# Patient Record
Sex: MALE | Employment: FULL TIME | URBAN - METROPOLITAN AREA
[De-identification: names, ages, dates, MRNs, and addresses within clinical notes are randomized per-mention and may not be internally consistent; named-entity substitution may affect disease eponyms.]

---

## 2020-12-23 ENCOUNTER — APPOINTMENT (OUTPATIENT)
Dept: URGENT CARE | Facility: CLINIC | Age: 35
End: 2020-12-23
Payer: COMMERCIAL

## 2020-12-23 DIAGNOSIS — Z11.59 SCREENING FOR VIRAL DISEASE: Primary | ICD-10-CM

## 2020-12-23 PROCEDURE — U0003 INFECTIOUS AGENT DETECTION BY NUCLEIC ACID (DNA OR RNA); SEVERE ACUTE RESPIRATORY SYNDROME CORONAVIRUS 2 (SARS-COV-2) (CORONAVIRUS DISEASE [COVID-19]), AMPLIFIED PROBE TECHNIQUE, MAKING USE OF HIGH THROUGHPUT TECHNOLOGIES AS DESCRIBED BY CMS-2020-01-R: HCPCS

## 2020-12-24 LAB — SARS-COV-2 RNA SPEC QL NAA+PROBE: NOT DETECTED

## 2020-12-28 ENCOUNTER — TELEPHONE (OUTPATIENT)
Dept: URGENT CARE | Facility: CLINIC | Age: 35
End: 2020-12-28

## 2020-12-29 ENCOUNTER — APPOINTMENT (OUTPATIENT)
Dept: URGENT CARE | Facility: CLINIC | Age: 35
End: 2020-12-29

## 2020-12-29 DIAGNOSIS — Z11.59 SCREENING FOR VIRAL DISEASE: Primary | ICD-10-CM

## 2020-12-29 PROCEDURE — U0003 INFECTIOUS AGENT DETECTION BY NUCLEIC ACID (DNA OR RNA); SEVERE ACUTE RESPIRATORY SYNDROME CORONAVIRUS 2 (SARS-COV-2) (CORONAVIRUS DISEASE [COVID-19]), AMPLIFIED PROBE TECHNIQUE, MAKING USE OF HIGH THROUGHPUT TECHNOLOGIES AS DESCRIBED BY CMS-2020-01-R: HCPCS

## 2020-12-31 LAB — SARS-COV-2 RNA SPEC QL NAA+PROBE: DETECTED

## 2021-01-02 ENCOUNTER — TELEPHONE (OUTPATIENT)
Dept: DERMATOLOGY | Facility: CLINIC | Age: 36
End: 2021-01-02

## 2021-01-02 NOTE — TELEPHONE ENCOUNTER
1st Attempt - The patient was called for notification of a test result for COVID-19  The patient did not answer the phone and a voicemail was left requesting a call back to [phone], Option 7

## 2021-01-06 ENCOUNTER — TELEPHONE (OUTPATIENT)
Dept: OTHER | Facility: OTHER | Age: 36
End: 2021-01-06

## 2021-01-07 NOTE — TELEPHONE ENCOUNTER
The patient was called for notification of a test result for COVID-19  The patient did not answer the phone and a voicemail was left requesting a call back to [phone], Option 7

## 2021-01-07 NOTE — TELEPHONE ENCOUNTER
2nd attempt   The patient was called for notification of a test result for COVID-19  The patient did not answer the phone and a voicemail was left requesting a call back to [phone], Option 7

## 2023-05-26 ENCOUNTER — APPOINTMENT (OUTPATIENT)
Dept: LAB | Facility: HOSPITAL | Age: 38
End: 2023-05-26

## 2023-05-26 ENCOUNTER — OCCMED (OUTPATIENT)
Dept: URGENT CARE | Facility: CLINIC | Age: 38
End: 2023-05-26
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
## 2023-05-26 LAB
ALT SERPL W P-5'-P-CCNC: 44 U/L (ref 7–52)
HIV 1+2 AB+HIV1 P24 AG SERPL QL IA: NORMAL
HIV 2 AB SERPL QL IA: NORMAL
HIV1 AB SERPL QL IA: NORMAL
HIV1 P24 AG SERPL QL IA: NORMAL

## 2023-05-28 LAB
HBV SURFACE AB SER-ACNC: 224 MIU/ML
HBV SURFACE AG SER QL: NORMAL
HCV AB SER QL: NORMAL

## 2024-11-07 ENCOUNTER — OFFICE VISIT (OUTPATIENT)
Dept: FAMILY MEDICINE CLINIC | Facility: CLINIC | Age: 39
End: 2024-11-07
Payer: COMMERCIAL

## 2024-11-07 VITALS
BODY MASS INDEX: 27.47 KG/M2 | OXYGEN SATURATION: 98 % | DIASTOLIC BLOOD PRESSURE: 67 MMHG | WEIGHT: 181.22 LBS | HEART RATE: 101 BPM | SYSTOLIC BLOOD PRESSURE: 117 MMHG | HEIGHT: 68 IN

## 2024-11-07 DIAGNOSIS — E11.69 ERECTILE DYSFUNCTION DUE TO DIABETES MELLITUS  (HCC): ICD-10-CM

## 2024-11-07 DIAGNOSIS — E11.9 DIABETES MELLITUS WITH HEMOGLOBIN A1C GOAL OF 7.0%-8.0% (HCC): Primary | ICD-10-CM

## 2024-11-07 DIAGNOSIS — Z83.3 FAMILY HISTORY OF DIABETES MELLITUS (DM): ICD-10-CM

## 2024-11-07 DIAGNOSIS — Z82.49 FAMILY HISTORY OF EARLY CAD: ICD-10-CM

## 2024-11-07 DIAGNOSIS — G47.33 OSA ON CPAP: ICD-10-CM

## 2024-11-07 DIAGNOSIS — G43.009 MIGRAINE WITHOUT AURA AND WITHOUT STATUS MIGRAINOSUS, NOT INTRACTABLE: ICD-10-CM

## 2024-11-07 DIAGNOSIS — Z56.6 STRESS AT WORK: ICD-10-CM

## 2024-11-07 DIAGNOSIS — N52.1 ERECTILE DYSFUNCTION DUE TO DIABETES MELLITUS  (HCC): ICD-10-CM

## 2024-11-07 DIAGNOSIS — R35.0 URINARY FREQUENCY: ICD-10-CM

## 2024-11-07 LAB
SL AMB  POCT GLUCOSE, UA: 500
SL AMB LEUKOCYTE ESTERASE,UA: ABNORMAL
SL AMB POCT BILIRUBIN,UA: NEGATIVE
SL AMB POCT BLOOD,UA: NEGATIVE
SL AMB POCT CLARITY,UA: CLEAR
SL AMB POCT COLOR,UA: YELLOW
SL AMB POCT HEMOGLOBIN AIC: 11.9 (ref ?–6.5)
SL AMB POCT KETONES,UA: 5
SL AMB POCT NITRITE,UA: NEGATIVE
SL AMB POCT PH,UA: 5
SL AMB POCT SPECIFIC GRAVITY,UA: 1.02
SL AMB POCT URINE PROTEIN: NEGATIVE
SL AMB POCT UROBILINOGEN: NEGATIVE

## 2024-11-07 PROCEDURE — 99204 OFFICE O/P NEW MOD 45 MIN: CPT | Performed by: PHYSICIAN ASSISTANT

## 2024-11-07 PROCEDURE — 82043 UR ALBUMIN QUANTITATIVE: CPT | Performed by: PHYSICIAN ASSISTANT

## 2024-11-07 PROCEDURE — 87086 URINE CULTURE/COLONY COUNT: CPT | Performed by: PHYSICIAN ASSISTANT

## 2024-11-07 PROCEDURE — 83036 HEMOGLOBIN GLYCOSYLATED A1C: CPT | Performed by: PHYSICIAN ASSISTANT

## 2024-11-07 PROCEDURE — 82570 ASSAY OF URINE CREATININE: CPT | Performed by: PHYSICIAN ASSISTANT

## 2024-11-07 PROCEDURE — 81002 URINALYSIS NONAUTO W/O SCOPE: CPT | Performed by: PHYSICIAN ASSISTANT

## 2024-11-07 RX ORDER — INSULIN DEGLUDEC 100 U/ML
10 INJECTION, SOLUTION SUBCUTANEOUS
Qty: 15 ML | Refills: 3 | Status: SHIPPED | OUTPATIENT
Start: 2024-11-07

## 2024-11-07 RX ORDER — ATORVASTATIN CALCIUM 10 MG/1
10 TABLET, FILM COATED ORAL DAILY
Qty: 30 TABLET | Refills: 5 | Status: SHIPPED | OUTPATIENT
Start: 2024-11-07

## 2024-11-07 RX ORDER — SILDENAFIL 50 MG/1
50 TABLET, FILM COATED ORAL DAILY PRN
Qty: 10 TABLET | Refills: 0 | Status: SHIPPED | OUTPATIENT
Start: 2024-11-07

## 2024-11-07 SDOH — HEALTH STABILITY - MENTAL HEALTH: OTHER PHYSICAL AND MENTAL STRAIN RELATED TO WORK: Z56.6

## 2024-11-07 NOTE — PROGRESS NOTES
Ambulatory Visit  Name: Dipak Temple      : 1985      MRN: [id number]  Encounter Provider: Meg Russell PA-C  Encounter Date: 2024   Encounter department: [facility]    Assessment & Plan  Urinary frequency  Patient's primary complaint today was having urinary frequency of very small amounts of urine.  He was not having hesitancy nor was he having a decrease in the force of his stream.  He states that he would void very frequently over the past 2-1/2 weeks but he was only producing a tiny amount of urine.  The symptoms began abruptly.  He had nocturia x 4-5 times.  He was not having any dysuria and he did not notice any hematuria or polyuria.  He had never had the symptoms in the past and the abrupt onset of his symptoms was the reason that he made the appointment.    Urine dip was done in the office with the results revealing 500 mg/Meghan glucose.  Without other explanation, this is consistent with new onset diabetes.  Orders:    POCT urine dip    HUNTER on CPAP  Patient states that he was diagnosed with obstructive sleep apnea and was maintained on a CPAP machine.  He was running the machine and it became too expensive for him and he therefore turned the machine back in order to save money.  He would like to regain his machine in the future if his finances improve.       Family history of diabetes mellitus (DM)  Patient's sister is having problems with retinopathy due to her diabetes.  His dad and his brother were also diagnosed with diabetes.  He is not know the details of whether they were type I or type II diabetic.       Family history of early CAD  Multiple family members have history of coronary artery disease especially given early age.  Patient is unsure if whether these members also have diabetes.       Migraine without aura and without status migrainosus, not intractable  Patient is getting 3-4 migraines per day.  As soon as the onset of migraine  occurs, the patient will take Excedrin over-the-counter which seems to help.  He is not formally been investigated for migraine but his symptoms are consistent with migraine without aura.  He is light sensitive and sound sensitive and nauseated.  He is unable to continue activities.  He also describes the pain as a pounding sensation.  Headaches are typically unilateral but severe migraines that do not respond to initial treatment with Excedrin can cause pain behind both eyes.       Stress at work  Patient is working up to 20 hours/day 7 days/week.  There are days that he works 3 consecutive 24-hour shifts without a break.  There are sleeping quarters that he can use and depending on how busy his job is, he may or may not be able to get rest.  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  He likes his job but he states he does not get to see his wife often as she works a corollary job and also spends up to 150 hours/week at the worksite.  The couple previously lived in [facility] and has recently relocated to the [facility].       Diabetes mellitus with hemoglobin A1c goal of 7.0%-8.0% ([facility])  Patient diagnosed with diabetes in the office today.  He was symptomatic with polydipsia drinking 1 gallon of water per day.  This was an abrupt change for him.  Water is his preferred beverage.  He cannot understand how he can drink a gallon of water and not produce much urine.  He was not having polyphasia.  He was not having any weight change.  His BMI is normal at 27.    Due to his age of 39, it is uncertain whether this is type I or type 2 diabetes.  His abrupt onset would be consistent with type I along with his normal body weight.  Insulin antibodies are being ordered to help to make this determination.    Patient is being sent to Dr. Brown for endocrinology evaluation and to [facility] for clinical pharmacy.  The patient may need a  "continuous glucose monitor along with supplies for checking his blood sugars.  I will allow her to make recommendations for the type of glucometer versus continuous glucose monitor and sensors.  No results found for: \"HGBA1C\"  Hemoglobin A1c was 11.9% in the office today.  Glucosuria was present to the tune of 500 mg/Meghan.    Patient was started on a statin low-dose in the form of atorvastatin.  Lipid profile is being done and we can dose adjust accordingly.    Additional orders were written as below for further evaluation.  Orders:    HEMOGLOBIN A1C W/ EAG ESTIMATION; Future    Comprehensive metabolic panel; Future    Lipid Panel with Direct LDL reflex; Future    TSH, 3rd generation with Free T4 reflex; Future    Ambulatory referral to clinical pharmacy; Future    sildenafil (VIAGRA) 50 MG tablet; Take 1 tablet (50 mg total) by mouth daily as needed for erectile dysfunction    Ambulatory Referral to Endocrinology; Future    Ambulatory Referral to Ophthalmology; Future    Lipid panel; Future    atorvastatin (LIPITOR) 10 mg tablet; Take 1 tablet (10 mg total) by mouth daily    insulin degludec (Tresiba FlexTouch) 100 units/mL injection pen; Inject 10 Units under the skin daily at bedtime    Insulin antibody; Future    Albumin / creatinine urine ratio; Future    Urine culture; Future    Albumin / creatinine urine ratio    Urine culture    POCT hemoglobin A1c    Erectile dysfunction due to diabetes mellitus  (HCC)  Patient given Viagra.  We talked about diabetes causing erectile dysfunction.  Improvement in his sugar may also improve his erectile dysfunction but this does not preclude a trial of Viagra for treatment.  No results found for: \"HGBA1C\"    Orders:    sildenafil (VIAGRA) 50 MG tablet; Take 1 tablet (50 mg total) by mouth daily as needed for erectile dysfunction       History of Present Illness     HPI: 39-year-old male new patient who had not had primary care services and had been seen in emergency " departments for his care.  Patient's main complaint was urinary frequency.  Delving into his history more, it was noted he was drinking a gallon of water per day which was an abrupt change.  He also noted blurred vision and had recently had an eye exam performed and glasses ordered.    He has not had weight change.  He is working up to 20 hours/day.  He does admit increased fatigue but blames this on his long work hours.    Patient has HUNTER but gave back his machine that he was running because he cannot afford it.    He has migraines and takes Excedrin with good results.  Gets 3-4 migraines per week so we should talk about preventative therapy for migraines in the future but I want to get his blood sugars under control because this could also be a precipitant for his migraines.    As part of shared decision making, the patient is willing to start some long-acting insulin.  Tresiba was selected only because of insurance coverage.  He is going to be also managed by the clinical pharmacist and endocrinologist.    He is having erectile dysfunction over the past several weeks and requested medication for this and this was given.    Patient was complaining of pressure in both testicles when he had frequent urination.  No fever or chills.  No penile discharge.  He is  and has not had any other sexual contacts.    History obtained from : patient  Review of Systems: No recent illnesses.  No travel outside of the [place].  No trauma.  Current Outpatient Medications on File Prior to Visit   Medication Sig Dispense Refill    aspirin-acetaminophen-caffeine (EXCEDRIN MIGRAINE) 250-250-65 MG per tablet Take 1 tablet by mouth every 6 (six) hours as needed for headaches       No current facility-administered medications on file prior to visit.      Social History     Tobacco Use    Smoking status: Never    Smokeless tobacco: Never   Vaping Use    Vaping status: Never Used   Substance and Sexual Activity    Alcohol use: Not  "Currently    Drug use: Never    Sexual activity: Not on file         Objective     /67 (BP Location: Left arm, Patient Position: Sitting, Cuff Size: Large)   Pulse 101   Ht 5' 8\" (1.727 m)   Wt 82.2 kg (181 lb 3.5 oz)   SpO2 98%   BMI 27.55 kg/m²     Physical Exam: Reviewed vital signs.  BMI is 27.  He is normotensive.    Heart exam revealed a regular rate without murmur rub or gallops.  Lungs are clear to auscultation.    Full exam including diabetic foot exam to be done in 1 month.  Administrative Statements   I have spent a total time of 55 minutes in caring for this patient on the day of the visit/encounter including Diagnostic results, Prognosis, Risks and benefits of tx options, Instructions for management, Patient and family education, Importance of tx compliance, Risk factor reductions, Impressions, Counseling / Coordination of care, Documenting in the medical record, Reviewing / ordering tests, medicine, procedures  , Obtaining or reviewing history  , and Communicating with other healthcare professionals .    Patient was offered influenza vaccine and declined.  "

## 2024-11-08 LAB
CREAT UR-MCNC: 79.5 MG/DL
MICROALBUMIN UR-MCNC: 12.5 MG/L
MICROALBUMIN/CREAT 24H UR: 16 MG/G CREATININE (ref 0–30)

## 2024-11-09 LAB — BACTERIA UR CULT: NORMAL

## 2024-11-11 ENCOUNTER — CLINICAL SUPPORT (OUTPATIENT)
Dept: FAMILY MEDICINE CLINIC | Facility: CLINIC | Age: 39
End: 2024-11-11

## 2024-11-11 DIAGNOSIS — E11.9 DIABETES MELLITUS WITH HEMOGLOBIN A1C GOAL OF 7.0%-8.0% (HCC): ICD-10-CM

## 2024-11-11 PROBLEM — E11.69: Status: ACTIVE | Noted: 2024-11-11

## 2024-11-11 PROBLEM — G47.33 OSA ON CPAP: Status: ACTIVE | Noted: 2024-11-11

## 2024-11-11 PROBLEM — Z56.6 STRESS AT WORK: Status: ACTIVE | Noted: 2024-11-11

## 2024-11-11 PROBLEM — N52.1: Status: ACTIVE | Noted: 2024-11-11

## 2024-11-11 PROBLEM — G43.009 MIGRAINE WITHOUT AURA AND WITHOUT STATUS MIGRAINOSUS, NOT INTRACTABLE: Status: ACTIVE | Noted: 2024-11-11

## 2024-11-11 PROCEDURE — PBNCHG PB NO CHARGE PLACEHOLDER: Performed by: PHARMACIST

## 2024-11-11 RX ORDER — LANCETS 33 GAUGE
EACH MISCELLANEOUS
Qty: 200 EACH | Refills: 3 | Status: SHIPPED | OUTPATIENT
Start: 2024-11-11

## 2024-11-11 RX ORDER — BLOOD SUGAR DIAGNOSTIC
STRIP MISCELLANEOUS
Qty: 200 EACH | Refills: 3 | Status: SHIPPED | OUTPATIENT
Start: 2024-11-11

## 2024-11-11 RX ORDER — BLOOD-GLUCOSE METER
KIT MISCELLANEOUS
Qty: 1 KIT | Refills: 0 | Status: SHIPPED | OUTPATIENT
Start: 2024-11-11

## 2024-11-11 NOTE — PROGRESS NOTES
[facility] Clinical Pharmacy Services  Ciera Sloan, Pharmacist    Assessment/ Plan     Assessment & Plan  Diabetes mellitus with hemoglobin A1c goal of 7.0%-8.0% ([facility])    Lab Results   Component Value Date    HGBA1C 11.9 (A) 11/07/2024     Goal A1c <7% .   Complications:  Microvascular complications:ED  Macrovascular complications: None identified at this time  Current Diabetes Regimen:  Tesiba  Historical DM Meds (reason for discontinuation):  None  On Additional Therapies:  Statin: yes  ACEI/ARB: no    Assessment: Patient is new diagnosis of diabetes and was newly started on insulin.  At this time it is unclear if he has type 1 or Type 2 due to presentation and rapid onset.  His sister has type 1 diabetes.  He has not picked up his insulin from the pharmacy yet because they needed to order it in.  They are supposed to get it today.  He did not get blood work yet because he did not realize it was ordered.  I did help him with looking for close this lab which would be [place] location and provided him with lab hours.    We did discuss glucose monitoring at home and different devices including fingerstick glucometer versus CGM.  Patient prefers fingerstick glucometer at this time.  He is aware that depending on his insulin regimen and if it intensifies to requiring mealtime insulin that it would be beneficial for him to utilize CGM in that scenario.  He acknowledged understanding    Changes to medication regimen:  insulin needles ordered   and start Tresiba 10 units daily. Insulin pen technique discussed.   Rx sent in glucometer and supplies    Orders:    Ambulatory referral to clinical pharmacy    Blood Glucose Monitoring Suppl (OneTouch Verio Reflect) w/Device KIT; Check blood sugars twice daily. Please substitute with appropriate alternative as covered by patient's insurance. Dx: E11.65    glucose blood (OneTouch Verio) test strip; Check blood sugars twice daily. Please substitute with appropriate  "alternative as covered by patient's insurance. Dx: E11.65    OneTouch Delica Lancets 33G MISC; Check blood sugars twice daily. Please substitute with appropriate alternative as covered by patient's insurance. Dx: E11.65    Insulin Pen Needle 32G X 4 MM MISC; Use daily    Follow-up: 2 weeks    Subjective   HPI    Medication Adherence/ Tolerability/ Cost:  Patient denies side effects, no issues with cost, 0 missed doses in last two week  aspirin-acetaminophen-caffeine  atorvastatin  sildenafil  Tresiba FlexTouch Sopn- has not picked up from pharmacy. We reviewed where to inject (stomach or thigh)   Pen needles     Review of Systems   Endocrine: Positive for polydipsia, polyphagia and polyuria.        2. Lifestyle:   Did not address diet today due to time.  Plan to review in depth with future calls    3. Home monitoring devices  Glucometer: No, Brand:   Continuous Glucose Monitor: No, Brand:     Objective       Blood Sugar Readings  The patient is currently checking blood glucose 0 times per day. Patient does not report with SMBG logs.    Date AM Post-Breakfast Lunch Post-Lunch Dinner Post-Dinner Comments                                                                                                                           Avg                ASCVD Risk:  The ASCVD Risk score (Keely DK, et al., 2019) failed to calculate for the following reasons:    Cannot find a previous HDL lab    Cannot find a previous total cholesterol lab     Vitals:  There were no vitals filed for this visit.    Eye Exam:    No results found for: \"LEFTDIABRET\", \"RIGHTDIABRET\"    Labs:  Lab Results   Component Value Date    SODIUM 136 03/24/2022    K 3.9 03/24/2022    EGFR 91 03/24/2022    CREATININE 1.07 03/24/2022    MICROALBCRE 16 11/07/2024       Lab Results   Component Value Date    HGBA1C 11.9 (A) 11/07/2024       Telemedicine consent  The patient was identified by name and date of birth. Dipak Temple was informed that this is a telemedicine " visit and that the visit is being conducted through Telephone.  My office door was closed. No one else was in the room.  He acknowledged consent and understanding of privacy and security of the video platform. The patient has agreed to participate and understands they can discontinue the visit at any time.    Pharmacist Tracking Tool     Pharmacist Tracking Tool  Reason For Outreach: Embedded Pharmacist  Demographics:  Intervention Method: Phone  Type of Intervention: New  Topics Addressed: Diabetes  Pharmacologic Interventions: Medication Initiation and Med Rec  Non-Pharmacologic Interventions: Disease state education and Medication/Device education  Time:  Direct Patient Care:  25  mins  Care Coordination:  10  mins  Recommendation Recipient: Patient/Caregiver and Provider  Outcome: Accepted

## 2024-11-11 NOTE — ASSESSMENT & PLAN NOTE
Lab Results   Component Value Date    HGBA1C 11.9 (A) 11/07/2024     Goal A1c <7% .   Complications:  Microvascular complications:ED  Macrovascular complications: None identified at this time  Current Diabetes Regimen:  Tesiba  Historical DM Meds (reason for discontinuation):  None  On Additional Therapies:  Statin: yes  ACEI/ARB: no    Assessment: Patient is new diagnosis of diabetes and was newly started on insulin.  At this time it is unclear if he has type 1 or Type 2 due to presentation and rapid onset.  His sister has type 1 diabetes.  He has not picked up his insulin from the pharmacy yet because they needed to order it in.  They are supposed to get it today.  He did not get blood work yet because he did not realize it was ordered.  I did help him with looking for close this lab which would be [facility] and provided him with lab hours.    We did discuss glucose monitoring at home and different devices including fingerstick glucometer versus CGM.  Patient prefers fingerstick glucometer at this time.  He is aware that depending on his insulin regimen and if it intensifies to requiring mealtime insulin that it would be beneficial for him to utilize CGM in that scenario.  He acknowledged understanding    Changes to medication regimen:  insulin needles ordered   and start Tresiba 10 units daily. Insulin pen technique discussed.   Rx sent in glucometer and supplies    Orders:    Ambulatory referral to clinical pharmacy    Blood Glucose Monitoring Suppl (OneTouch Verio Reflect) w/Device KIT; Check blood sugars twice daily. Please substitute with appropriate alternative as covered by patient's insurance. Dx: E11.65    glucose blood (OneTouch Verio) test strip; Check blood sugars twice daily. Please substitute with appropriate alternative as covered by patient's insurance. Dx: E11.65    OneTouch Delica Lancets 33G MISC; Check blood sugars twice daily. Please substitute with appropriate alternative  as covered by patient's insurance. Dx: E11.65    Insulin Pen Needle 32G X 4 MM MISC; Use daily

## 2024-11-11 NOTE — ASSESSMENT & PLAN NOTE
Patient states that he was diagnosed with obstructive sleep apnea and was maintained on a CPAP machine.  He was running the machine and it became too expensive for him and he therefore turned the machine back in order to save money.  He would like to regain his machine in the future if his finances improve.

## 2024-11-11 NOTE — ASSESSMENT & PLAN NOTE
Patient is working up to 20 hours/day 7 days/week.  There are days that he works 3 consecutive 24-hour shifts without a break.  There are sleeping quarters that he can use and depending on how busy his job is, he may or may not be able to get rest.  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  He likes his job but he states he does not get to see his wife often as she works a corollary job and also spends up to 150 hours/week at the worksite.  The couple previously lived in [facility] and has recently relocated to the [facility].

## 2024-11-11 NOTE — ASSESSMENT & PLAN NOTE
"Patient given Viagra.  We talked about diabetes causing erectile dysfunction.  Improvement in his sugar may also improve his erectile dysfunction but this does not preclude a trial of Viagra for treatment.  No results found for: \"HGBA1C\"    Orders:    sildenafil (VIAGRA) 50 MG tablet; Take 1 tablet (50 mg total) by mouth daily as needed for erectile dysfunction  "

## 2024-11-11 NOTE — ASSESSMENT & PLAN NOTE
"Patient diagnosed with diabetes in the office today.  He was symptomatic with polydipsia drinking 1 gallon of water per day.  This was an abrupt change for him.  Water is his preferred beverage.  He cannot understand how he can drink a gallon of water and not produce much urine.  He was not having polyphasia.  He was not having any weight change.  His BMI is normal at 27.    Due to his age of 39, it is uncertain whether this is type I or type 2 diabetes.  His abrupt onset would be consistent with type I along with his normal body weight.  Insulin antibodies are being ordered to help to make this determination.    Patient is being sent to Dr. Brown for endocrinology evaluation and to [facility] for clinical pharmacy.  The patient may need a continuous glucose monitor along with supplies for checking his blood sugars.  I will allow her to make recommendations for the type of glucometer versus continuous glucose monitor and sensors.  No results found for: \"HGBA1C\"  Hemoglobin A1c was 11.9% in the office today.  Glucosuria was present to the tune of 500 mg/Meghan.    Patient was started on a statin low-dose in the form of atorvastatin.  Lipid profile is being done and we can dose adjust accordingly.    Additional orders were written as below for further evaluation.  Orders:    HEMOGLOBIN A1C W/ EAG ESTIMATION; Future    Comprehensive metabolic panel; Future    Lipid Panel with Direct LDL reflex; Future    TSH, 3rd generation with Free T4 reflex; Future    Ambulatory referral to clinical pharmacy; Future    sildenafil (VIAGRA) 50 MG tablet; Take 1 tablet (50 mg total) by mouth daily as needed for erectile dysfunction    Ambulatory Referral to Endocrinology; Future    Ambulatory Referral to Ophthalmology; Future    Lipid panel; Future    atorvastatin (LIPITOR) 10 mg tablet; Take 1 tablet (10 mg total) by mouth daily    insulin degludec (Tresiba FlexTouch) 100 units/mL injection pen; Inject 10 Units under the skin daily at " bedtime    Insulin antibody; Future    Albumin / creatinine urine ratio; Future    Urine culture; Future    Albumin / creatinine urine ratio    Urine culture    POCT hemoglobin A1c

## 2024-11-11 NOTE — ASSESSMENT & PLAN NOTE
Patient is getting 3-4 migraines per day.  As soon as the onset of migraine occurs, the patient will take Excedrin over-the-counter which seems to help.  He is not formally been investigated for migraine but his symptoms are consistent with migraine without aura.  He is light sensitive and sound sensitive and nauseated.  He is unable to continue activities.  He also describes the pain as a pounding sensation.  Headaches are typically unilateral but severe migraines that do not respond to initial treatment with Excedrin can cause pain behind both eyes.

## 2024-12-02 ENCOUNTER — TELEPHONE (OUTPATIENT)
Dept: FAMILY MEDICINE CLINIC | Facility: CLINIC | Age: 39
End: 2024-12-02

## 2024-12-02 NOTE — TELEPHONE ENCOUNTER
First attempt to reach [facility] to schedule an appointment with Ciera. Patient didn't answer/ left a voicemail.

## 2024-12-02 NOTE — TELEPHONE ENCOUNTER
Please contact patient to reschedule missed Clinical Pharmacist Appointment     Reason for appointment: diabetes  When to schedule with Pharmacist: as soon as available  What should the patient bring to the appointment: med list and glucose log    Appointment Department: [facility] PRIMARY [facility]ARE  Pharmacist patient will be seeing: Jack Agustin  Visit Type Preference (ie Phone, Video, In-person): no preference   In-person visits will be at: [facility]  Virtual visits will be completed via [employer] or Phone - please clarify patient preference in appointment notes    Please respond to this note to keep track of the number of patient outreaches.     Please try to reach out to patient on 2 separate days